# Patient Record
Sex: FEMALE | Race: WHITE | NOT HISPANIC OR LATINO | Employment: FULL TIME | URBAN - METROPOLITAN AREA
[De-identification: names, ages, dates, MRNs, and addresses within clinical notes are randomized per-mention and may not be internally consistent; named-entity substitution may affect disease eponyms.]

---

## 2017-07-27 ENCOUNTER — TELEPHONE (OUTPATIENT)
Dept: PEDIATRICS | Facility: CLINIC | Age: 22
End: 2017-07-27

## 2017-07-27 NOTE — TELEPHONE ENCOUNTER
Patient's states last visit was in January 2017.  She was prescribed dicyclomine 1 tab TID PRN.  She has run out of refills.  Can you refill?  Pharmacy is [facility], [place]. [place]    I looked at her office visits.  Last visit was June 2016.  Patient needs appt.  However, she is 22.  Will tell her she needs to move over to adult providers. Mariola

## 2018-04-10 PROBLEM — F41.9 ANXIETY AND DEPRESSION: Status: ACTIVE | Noted: 2018-04-10

## 2018-04-10 PROBLEM — F32.A ANXIETY AND DEPRESSION: Status: ACTIVE | Noted: 2018-04-10

## 2021-05-12 ENCOUNTER — PATIENT MESSAGE (OUTPATIENT)
Dept: RESEARCH | Facility: HOSPITAL | Age: 26
End: 2021-05-12

## 2025-02-12 ENCOUNTER — OFFICE VISIT (OUTPATIENT)
Dept: FAMILY MEDICINE | Facility: CLINIC | Age: 30
End: 2025-02-12
Payer: OTHER GOVERNMENT

## 2025-02-12 VITALS
DIASTOLIC BLOOD PRESSURE: 100 MMHG | HEIGHT: 69 IN | TEMPERATURE: 99 F | WEIGHT: 217.19 LBS | BODY MASS INDEX: 32.17 KG/M2 | OXYGEN SATURATION: 99 % | HEART RATE: 101 BPM | SYSTOLIC BLOOD PRESSURE: 152 MMHG

## 2025-02-12 DIAGNOSIS — R06.89 GASPING FOR BREATH: ICD-10-CM

## 2025-02-12 DIAGNOSIS — G47.19 DAYTIME HYPERSOMNOLENCE: ICD-10-CM

## 2025-02-12 DIAGNOSIS — I10 PRIMARY HYPERTENSION: Primary | ICD-10-CM

## 2025-02-12 DIAGNOSIS — F41.9 ANXIETY AND DEPRESSION: ICD-10-CM

## 2025-02-12 DIAGNOSIS — F10.10 ETOH ABUSE: ICD-10-CM

## 2025-02-12 DIAGNOSIS — R06.83 LOUD SNORING: ICD-10-CM

## 2025-02-12 DIAGNOSIS — F32.A ANXIETY AND DEPRESSION: ICD-10-CM

## 2025-02-12 RX ORDER — DESVENLAFAXINE 50 MG/1
50 TABLET, FILM COATED, EXTENDED RELEASE ORAL DAILY
Qty: 30 TABLET | Refills: 1 | Status: SHIPPED | OUTPATIENT
Start: 2025-02-12

## 2025-02-12 RX ORDER — HYDROXYZINE PAMOATE 25 MG/1
25 CAPSULE ORAL 3 TIMES DAILY PRN
COMMUNITY
Start: 2025-02-10 | End: 2025-02-20

## 2025-02-12 RX ORDER — AMLODIPINE BESYLATE 5 MG/1
5 TABLET ORAL DAILY
Qty: 30 TABLET | Refills: 1 | Status: SHIPPED | OUTPATIENT
Start: 2025-02-12 | End: 2026-02-12

## 2025-02-12 RX ORDER — HYDROCHLOROTHIAZIDE 12.5 MG/1
12.5 TABLET ORAL DAILY
Qty: 30 TABLET | Refills: 1 | Status: SHIPPED | OUTPATIENT
Start: 2025-02-12

## 2025-02-12 RX ORDER — LISINOPRIL 20 MG/1
20 TABLET ORAL 2 TIMES DAILY
COMMUNITY

## 2025-02-12 NOTE — PROGRESS NOTES
SUBJECTIVE:      Patient ID: Geeta Cason is a 30 y.o. female.    Chief Complaint: Establish Care and Hypertension (Takes BP at home and says it has running 140-150 systolic )    History of Present Illness    CHIEF COMPLAINT:  Geeta presents to establish care and discuss management of hypertension, anxiety, and alcohol use.    HPI:  Geeta presents today to establish care.  She  reports a history of hypertension, diagnosed approximately 2 years ago. She has been self-managing her condition with lisinopril 20mg twice daily, obtained through an online service called [employer]. She had an episode of lightheadedness and chest tightness at work on Monday, prompting an ER visit where her blood pressure was noted to be significantly elevated. She was prescribed hydroxyzine 25mg 3 times daily as needed for anxiety, which she has been taking daily due to its sedating effects.    She describes anxiety, including racing thoughts, and believes she may have had her first panic attack during her recent ER visit. She reports a history of depression and anxiety since 2018, for which she previously took Pristiq 100mg daily for about 5 years. She discontinued Pristiq approximately 1.5 years ago, feeling it was no longer effective.  She is open to restarting medication today though.    She discloses a history of alcohol use, consuming alcohol daily for 5-6 years. About a month ago, she began efforts to reduce her consumption due to adverse effects. She has reduced her intake to 2 days a week, consuming about half a bottle of vodka on each occasion. She acknowledges that alcohol use has been interfering with her daily life and expresses interest in seeking help for this issue.    She also reports symptoms suggestive of sleep apnea, including daytime fatigue, waking up gasping for air every 20 to 30 seconds at times with associated tachycardia, and loud snoring.    She denies any swelling in her legs, chest pain outside of the  recent ER visit, shortness of breath, and anyone observing her stop breathing during sleep.    MEDICATIONS:  Geeta started Hydroxyzine 25 mg daily as needed for anxiety on Monday. She is on Lisinopril 20 mg twice daily for hypertension, obtained from [employer]. Geeta discontinued Pristiq 100 mg daily for anxiety/depression about 1.5 years ago.    MEDICAL HISTORY:  Geeta has a history of hypertension diagnosed approximately 2 years ago. She has had depression and anxiety since 2018. She also has a history of alcohol use disorder, with 5-6 years of daily drinking, recently reduced to twice weekly. Geeta is not using any contraception. There is a possibility of pregnancy.    SOCIAL HISTORY:  Alcohol: Daily drinking for 5-6 years, recently reduced to 2 days per week, about half a bottle of vodka each time. Attempting to quit for about a month. Occupation: Works at post [employer]    TEST RESULTS:  Two days ago, the patient's liver enzymes were elevated, magnesium levels were low, and kidney function was mostly okay.      ROS:  General: -fever, -chills, +fatigue, -weight gain, -weight loss, -change in weight  Eyes: -vision changes, -redness, -discharge  ENT: -ear pain, -nasal congestion, -sore throat  Cardiovascular: -chest pain, -palpitations, -lower extremity edema, +chest tightness  Respiratory: -cough, -shortness of breath  Gastrointestinal: -abdominal pain, -nausea, -vomiting, -diarrhea, -constipation, -blood in stool  Genitourinary: -dysuria, -hematuria, -frequency  Musculoskeletal: -joint pain, -muscle pain  Skin: -rash, -lesion  Neurological: -headache, -dizziness, -numbness, -tingling, +lightheadedness  Psychiatric: +anxiety, +depression, -sleep difficulty        Review of Systems    Past Medical History:   Diagnosis Date    Anxiety     Depression      Current Outpatient Medications   Medication Sig Dispense Refill    hydrOXYzine pamoate (VISTARIL) 25 MG Cap Take 25 mg by mouth 3 (three) times daily as  needed.      lisinopriL (PRINIVIL,ZESTRIL) 20 MG tablet Take 20 mg by mouth 2 (two) times a day.      amLODIPine (NORVASC) 5 MG tablet Take 1 tablet (5 mg total) by mouth once daily. 30 tablet 1    desvenlafaxine succinate (PRISTIQ) 50 MG Tb24 Take 1 tablet (50 mg total) by mouth once daily. 30 tablet 1    hydroCHLOROthiazide 12.5 MG Tab Take 1 tablet (12.5 mg total) by mouth once daily. 30 tablet 1     No current facility-administered medications for this visit.     Review of patient's allergies indicates:  No Known Allergies   History reviewed. No pertinent surgical history.  Social History     Socioeconomic History    Marital status: Single   Tobacco Use    Smoking status: Every Day     Types: Vaping with nicotine     Start date: 2021    Smokeless tobacco: Never   Substance and Sexual Activity    Alcohol use: Yes     Alcohol/week: 56.0 standard drinks of alcohol     Types: 56 Shots of liquor per week     Comment: pt states drinking 8 shots of liquor a day but has been trying to stop due to her high bp    Drug use: No    Sexual activity: Yes     Partners: Male     Social Drivers of Health     Financial Resource Strain: Low Risk  (2/12/2025)    Overall Financial Resource Strain (CARDIA)     Difficulty of Paying Living Expenses: Not hard at all   Food Insecurity: No Food Insecurity (2/12/2025)    Hunger Vital Sign     Worried About Running Out of Food in the Last Year: Never true     Ran Out of Food in the Last Year: Never true   Transportation Needs: No Transportation Needs (2/12/2025)    PRAPARE - Transportation     Lack of Transportation (Medical): No     Lack of Transportation (Non-Medical): No   Physical Activity: Sufficiently Active (2/12/2025)    Exercise Vital Sign     Days of Exercise per Week: 3 days     Minutes of Exercise per Session: 60 min   Stress: Stress Concern Present (2/12/2025)    [employer] - Occupational Stress Questionnaire     Feeling of Stress : Very much  "  Housing Stability: Low Risk  (2/12/2025)    Housing Stability Vital Sign     Unable to Pay for Housing in the Last Year: No     Number of Times Moved in the Last Year: 0     Homeless in the Last Year: No     No family history on file.       OBJECTIVE:      Vitals:    02/12/25 1338   BP: (!) 152/100   BP Location: Left arm   Patient Position: Sitting   Pulse: 101   Temp: 98.7 °F (37.1 °C)   TempSrc: Oral   SpO2: 99%   Weight: 98.5 kg (217 lb 3.2 oz)   Height: 5' 9" (1.753 m)     Physical Exam  Vitals and nursing note reviewed.   Constitutional:       General: She is not in acute distress.     Appearance: Normal appearance. She is well-developed. She is not ill-appearing or toxic-appearing.   HENT:      Head: Normocephalic and atraumatic.      Right Ear: Tympanic membrane, ear canal and external ear normal.      Left Ear: Tympanic membrane, ear canal and external ear normal.      Nose: Nose normal. No congestion or rhinorrhea.      Mouth/Throat:      Mouth: Mucous membranes are moist.      Pharynx: Oropharynx is clear. Uvula midline. No oropharyngeal exudate or posterior oropharyngeal erythema.   Eyes:      General: Lids are normal. No scleral icterus.     Conjunctiva/sclera: Conjunctivae normal.      Pupils: Pupils are equal, round, and reactive to light.      Right eye: Pupil is round and reactive.      Left eye: Pupil is round and reactive.   Neck:      Thyroid: No thyromegaly.      Vascular: No JVD.      Trachea: Trachea normal.   Cardiovascular:      Rate and Rhythm: Normal rate and regular rhythm.      Pulses: Normal pulses.      Heart sounds: Normal heart sounds. No murmur heard.  Pulmonary:      Effort: Pulmonary effort is normal. No tachypnea or respiratory distress.      Breath sounds: Normal breath sounds. No wheezing.   Abdominal:      General: Bowel sounds are normal.      Palpations: Abdomen is soft.      Tenderness: There is no abdominal tenderness. There is no guarding.      Hernia: No hernia is " present.   Musculoskeletal:         General: Normal range of motion.      Cervical back: Normal range of motion and neck supple. No tenderness.      Right lower leg: No edema.      Left lower leg: No edema.   Lymphadenopathy:      Cervical: No cervical adenopathy.   Skin:     General: Skin is warm and dry.      Coloration: Skin is not pale.      Findings: No erythema or rash.   Neurological:      Mental Status: She is alert and oriented to person, place, and time.   Psychiatric:         Mood and Affect: Mood is anxious.         Speech: Speech normal.         Behavior: Behavior normal. Behavior is cooperative.         Thought Content: Thought content normal.         Judgment: Judgment normal.            Assessment:       1. Primary hypertension    2. ETOH abuse    3. Anxiety and depression    4. Loud snoring    5. Daytime hypersomnolence    6. Gasping for breath        Plan:       Assessment & Plan    - Assessed patient's hypertension, depression, and anxiety  - Evaluated alcohol use and its impact on patient's health, including elevated liver enzymes  - Considered medication changes for hypertension management  - Reassessed effectiveness of previous antidepressant (Pristiq) after 1.5-year discontinuation  - Evaluated sleep issues, suspecting possible sleep apnea    PLAN SUMMARY:  - Reduced Lisinopril 20 mg to daily in the morning  - Initiated HCTZ 12.5 mg daily in the morning  - Started Amlodipine 5 mg daily at night  - Reinitiated Pristiq 50 mg daily  - Continued Hydroxyzine 25 mg as needed, preferably at night  - Ordered sleep study  - Referred patient to Dr. Arnol Yung, addiction specialist  - Scheduled follow up in 1 month for blood pressure check and anxiety/depression assessment  - Scheduled follow up in 3 months with labs to reassess liver function    I10 ESSENTIAL (PRIMARY) HYPERTENSION:  - Initiated HCTZ 12.5 mg daily in the morning.  - Started Amlodipine 5 mg daily at night.  - Reduced Lisinopril 20 mg  from twice daily to daily in the morning.  - Discussed lifestyle factors affecting hypertension, including alcohol consumption and exercise.  - Scheduled follow up in 1 month for blood pressure check.  - Advised that if blood pressure is controlled at 1-month follow-up, next appointment will be in 3 months.  - Noted that the patient has been aware of hypertension for about 2 years and blood pressure was elevated during recent emergency department visit.  - Discovered that the patient has been self-medicating with Lisinopril 20 mg twice daily obtained from an online source.    F41.9 ANXIETY DISORDER, UNSPECIFIED:  - Continued Hydroxyzine 25 mg as needed, preferably at night for sleep if experiencing racing thoughts.  - Reinitiated Pristiq 50 mg daily.  - Scheduled follow up in 1 month for anxiety and depression assessment.  - Explained hydroxyzine's similarity to high-dose diphenhydramine and its sedating effects.  - Noted that the patient has had anxiety since 2018 and recently had a possible panic attack.  - Discussed previous anxiety treatments and genetic testing results that limited medication options.    R74.8 ABNORMAL LEVELS OF OTHER SERUM ENZYMES:  - Scheduled follow up in 3 months with labs to reassess liver function.  - Acknowledged liver damage due to alcohol consumption.  - Noted that recent labs from the hospital showed elevated liver enzymes.      F10.20 ALCOHOL DEPENDENCE, UNCOMPLICATED:  - Explained the connection between alcohol use and hypertension, potential for liver damage from long-term use, and different types of rehabilitation programs (inpatient vs. outpatient).  - Referred the patient to Dr. Arnol Yung, addiction specialist in [place].  - Noted that the patient reports a history of daily alcohol consumption for 5-6 years, recently reduced to twice weekly, consuming about half a bottle of vodka each time.  - Acknowledged that the patient recognizes alcohol consumption as a problem  interfering with daily life.  - Discussed outpatient treatment options and medication for alcohol dependence.    G47.30 SLEEP APNEA, UNSPECIFIED:  - Ordered a sleep study.  - Noted that the patient reports waking up gasping for air, feeling like throat is closed, and experiencing daytime f  atigue.  - Acknowledged potential link between alcohol consumption and sleep apnea symptoms.    G47.9 SLEEP DISORDER, UNSPECIFIED:  - Continued Hydroxyzine 25 mg as needed, preferably at night for sleep if experiencing racing thoughts.  - Noted that the patient reports waking up frequently during sleep, experiencing tachycardia, and gasping for air.    R06.83 SNORING:  - Noted that the patient reports sometimes snoring loudly.  - Ordered a sleep study which may also evaluate snoring.        Primary hypertension  Reduce lisinopril to 20mg daily, verses BID. Risk factors associated with Hypertension are stroke, heart attack, renal damage, etc. It is important to incorporate dietary changes, lifestyle modifications, and physical activity, along with medication management. Limit sodium intake, alcohol consumption, caffeine, stimulants, and NSAIDs; Lifestyle changes: Weight loss, 30-45 min of cardiovascular exercise 3-4 days/week, smoking cessation; stress management; Goal BP <130/80.   Continue current medications and home BP monitoring. Bring home reading to visit.   -     amLODIPine (NORVASC) 5 MG tablet; Take 1 tablet (5 mg total) by mouth once daily.  Dispense: 30 tablet; Refill: 1  -     hydroCHLOROthiazide 12.5 MG Tab; Take 1 tablet (12.5 mg total) by mouth once daily.  Dispense: 30 tablet; Refill: 1    ETOH abuse  -     Ambulatory referral/consult to Addiction Specialist; Future; Expected date: 02/12/2025    Anxiety and depression  Discussed adverse reaction and contraindications of medications prescribed. Will observe closely  An adjunctive treatment includes:  -Exercise 30 min daily- increases energy and wellbeing, reducing  stress  -Obtain 7-8hr of sleep at night (avoid caffeine after lunch and watching TV late at night)  -Participate in activities to enhance interpersonal relationship and hobbies   -Consider seeking counseling to work through triggers and learning copping skills- will refer and provided resources when patient desires.  -Limit or reframe from smoking, alcohol and caffeine (all factors can increase anxiety)                -Use relaxation techniques. Visualization techniques, meditation, and yoga are examples of relaxation techniques that can ease anxiety.                                                                                                                                                                                                                                                                                                                                                                                                                                                                                                                                                                                                                                                                                                                                                                                                                                                                                                                                                                                                                                                                                                                                                                                                                                                                                                                                                                                                                                                                                    -Will follow-up 4 weeks.    Advised patient to go to ER if having Suicidal or Homicidal Ideation.   -     desvenlafaxine succinate (PRISTIQ) 50 MG Tb24; Take 1 tablet (50 mg total) by mouth once daily.  Dispense: 30 tablet; Refill: 1    Loud snoring  Ordering home sleep study    Daytime hypersomnolence  Ordering home sleep study    Gasping for breath  Ordering home sleep study      No follow-ups on file.      2/12/2025 LIZET Rascon, BEBO  This note was generated with the assistance of ambient listening technology. Verbal consent was obtained by the patient and accompanying visitor(s) for the recording of patient appointment to facilitate this note. I attest to having reviewed and edited the generated note for accuracy, though some syntax or spelling errors may persist. Please contact the author of this note for any clarification.

## 2025-02-13 ENCOUNTER — PATIENT MESSAGE (OUTPATIENT)
Dept: FAMILY MEDICINE | Facility: CLINIC | Age: 30
End: 2025-02-13
Payer: OTHER GOVERNMENT

## 2025-02-28 ENCOUNTER — PATIENT MESSAGE (OUTPATIENT)
Dept: FAMILY MEDICINE | Facility: CLINIC | Age: 30
End: 2025-02-28
Payer: OTHER GOVERNMENT

## 2025-02-28 DIAGNOSIS — F41.9 ANXIETY AND DEPRESSION: ICD-10-CM

## 2025-02-28 DIAGNOSIS — I10 PRIMARY HYPERTENSION: ICD-10-CM

## 2025-02-28 DIAGNOSIS — F32.A ANXIETY AND DEPRESSION: ICD-10-CM

## 2025-02-28 RX ORDER — LISINOPRIL 20 MG/1
20 TABLET ORAL DAILY
Qty: 30 TABLET | Refills: 0 | Status: SHIPPED | OUTPATIENT
Start: 2025-02-28

## 2025-02-28 RX ORDER — DESVENLAFAXINE 50 MG/1
50 TABLET, FILM COATED, EXTENDED RELEASE ORAL DAILY
Qty: 30 TABLET | Refills: 0 | Status: SHIPPED | OUTPATIENT
Start: 2025-02-28

## 2025-02-28 RX ORDER — AMLODIPINE BESYLATE 5 MG/1
5 TABLET ORAL DAILY
Qty: 30 TABLET | Refills: 0 | Status: SHIPPED | OUTPATIENT
Start: 2025-02-28 | End: 2026-02-28

## 2025-02-28 RX ORDER — HYDROCHLOROTHIAZIDE 12.5 MG/1
12.5 TABLET ORAL DAILY
Qty: 30 TABLET | Refills: 0 | Status: SHIPPED | OUTPATIENT
Start: 2025-02-28

## 2025-03-18 ENCOUNTER — DOCUMENTATION ONLY (OUTPATIENT)
Dept: FAMILY MEDICINE | Facility: CLINIC | Age: 30
End: 2025-03-18
Payer: OTHER GOVERNMENT

## 2025-03-18 ENCOUNTER — TELEPHONE (OUTPATIENT)
Dept: FAMILY MEDICINE | Facility: CLINIC | Age: 30
End: 2025-03-18
Payer: OTHER GOVERNMENT

## 2025-03-18 DIAGNOSIS — Z92.89 H/O SLEEP STUDY: Primary | ICD-10-CM
